# Patient Record
Sex: FEMALE | Race: WHITE | NOT HISPANIC OR LATINO | URBAN - METROPOLITAN AREA
[De-identification: names, ages, dates, MRNs, and addresses within clinical notes are randomized per-mention and may not be internally consistent; named-entity substitution may affect disease eponyms.]

---

## 2019-01-10 ENCOUNTER — EMERGENCY (EMERGENCY)
Facility: HOSPITAL | Age: 24
LOS: 1 days | Discharge: HOME | End: 2019-01-10
Attending: EMERGENCY MEDICINE
Payer: COMMERCIAL

## 2019-01-10 VITALS
RESPIRATION RATE: 18 BRPM | HEART RATE: 76 BPM | TEMPERATURE: 98 F | OXYGEN SATURATION: 97 % | SYSTOLIC BLOOD PRESSURE: 116 MMHG | WEIGHT: 125.66 LBS | DIASTOLIC BLOOD PRESSURE: 74 MMHG | HEIGHT: 65 IN

## 2019-01-10 VITALS
DIASTOLIC BLOOD PRESSURE: 65 MMHG | RESPIRATION RATE: 18 BRPM | SYSTOLIC BLOOD PRESSURE: 115 MMHG | TEMPERATURE: 98 F | HEART RATE: 65 BPM | OXYGEN SATURATION: 95 %

## 2019-01-10 LAB
APPEARANCE UR: ABNORMAL
BACTERIA # UR AUTO: ABNORMAL
BILIRUB UR-MCNC: ABNORMAL
COLOR SPEC: ABNORMAL
DIFF PNL FLD: ABNORMAL
EPI CELLS # UR: 11 /HPF — HIGH
GLUCOSE UR QL: NEGATIVE — SIGNIFICANT CHANGE UP
HYALINE CASTS # UR AUTO: 5 /LPF — HIGH (ref 0–2)
KETONES UR-MCNC: NEGATIVE — SIGNIFICANT CHANGE UP
LEUKOCYTE ESTERASE UR-ACNC: ABNORMAL
NITRITE UR-MCNC: POSITIVE
PH UR: 8.5 — HIGH (ref 5–8)
PROT UR-MCNC: ABNORMAL
RBC CASTS # UR COMP ASSIST: 1 /HPF — SIGNIFICANT CHANGE UP (ref 0–4)
SP GR SPEC: 1.02 — SIGNIFICANT CHANGE UP (ref 1.01–1.02)
UROBILINOGEN FLD QL: ABNORMAL
WBC UR QL: 3 /HPF — SIGNIFICANT CHANGE UP (ref 0–5)

## 2019-01-10 PROCEDURE — 99283 EMERGENCY DEPT VISIT LOW MDM: CPT

## 2019-01-10 PROCEDURE — 81001 URINALYSIS AUTO W/SCOPE: CPT

## 2019-01-10 RX ORDER — CEFPODOXIME PROXETIL 100 MG
1 TABLET ORAL
Qty: 28 | Refills: 0 | OUTPATIENT
Start: 2019-01-10 | End: 2019-01-23

## 2019-01-10 RX ORDER — CEFPODOXIME PROXETIL 100 MG
200 TABLET ORAL ONCE
Qty: 0 | Refills: 0 | Status: COMPLETED | OUTPATIENT
Start: 2019-01-10 | End: 2019-01-10

## 2019-01-10 RX ADMIN — Medication 200 MILLIGRAM(S): at 14:10

## 2019-01-10 NOTE — ED PROVIDER NOTE - CARDIAC, MLM
Normal rate, regular rhythm.  Heart sounds S1, S2.  No murmurs, rubs or gallops. no LE swelling bilaterally

## 2019-01-10 NOTE — ED PROVIDER NOTE - MEDICAL DECISION MAKING DETAILS
(Lopez Montero MD): 24 y/o female with pmhx of frequent UTI presents with dysuria and increased urinary frequency with prior hx of untreated UTI. Will check urine, Upreg, and reassess.

## 2019-01-10 NOTE — ED PROVIDER NOTE - NSFOLLOWUPINSTRUCTIONS_ED_ALL_ED_FT
Urinary Tract Infection,urinary tract infection (UTI) is an infection of any part of the urinary tract, which includes the kidneys, ureters, bladder, and urethra. These organs make, store, and get rid of urine in the body. UTI can be a bladder infection (cystitis) or kidney infection (pyelonephritis).    What are the causes?  This infection may be caused by fungi, viruses, and bacteria. Bacteria are the most common cause of UTIs. This condition can also be caused by repeated incomplete emptying of the bladder during urination.     antibiotic medicines frequently or for long periods of time, and the antibiotics no longer work well against certain types of infections (antibiotic resistance).    Fever.  Frequent urination or passing small amounts of urine frequently.  Needing to urinate urgently.  Pain or a burning sensation with urination.  Urine that smells bad or unusual.  Cloudy urine.  Pain in the lower abdomen or back.  Bed wetting.  Trouble urinating.  Blood in the urine.  Irritability.  Vomiting or refusal to eat.  Loose stools.  Sleeping more often than usual.  Being less active than usual.  Vaginal discharge for girls.    Contact a health care provider if:  back pain.  fever.  nauseous or vomits.  symptoms have not improved after you have given antibiotics for two days.  symptoms go away and then return.  Get help right away if:  severe back pain or lower abdominal pain.    Take Cefpodoxime twice per day for 2 weeks

## 2019-01-10 NOTE — ED PROVIDER NOTE - GASTROINTESTINAL NEGATIVE STATEMENT, MLM
no abdominal pain, no bloating, no constipation, no diarrhea, no nausea, no blood in stool, no flank pain

## 2019-01-10 NOTE — ED PROVIDER NOTE - NS_ ATTENDINGSCRIBEDETAILS _ED_A_ED_FT
I performed a history and physical exam of the patient and discussed their management with the resident. I reviewed the scribe's note and agree with the documented findings and plan of care.  Shae Lopez MD

## 2019-01-10 NOTE — ED PROVIDER NOTE - OBJECTIVE STATEMENT
22 y/o female with pmhx of frequent UTI c/o pain/burning upon urination x2 days .+increased urinary frequency. Pt states sxs are similar to past UTIs. Took pyridium this morning for pain. States she would usually get fever and bleeding with UTIs. Since, she gets UTIs more frequently (4x since 9/2018) but with less severity. Had 2 emetic episodes since yesterday but feels they are related to stress from personal life. Pt has only gotten abx for UTI once in last 4 episodes. States that with her first UTI, PMD believed she passed a kidney stone due to associated sxs but is unsure. Pt has also had an ovarian cyst in the past. Denies fever, flank pain, vaginal discharge, vaginal bleeding, diarrhea, or blood in stool.    Unsure of LNMP. Has an IUD. Sexually active with 1 partner, male. No other protection.

## 2019-01-10 NOTE — ED ADULT NURSE NOTE - NSIMPLEMENTINTERV_GEN_ALL_ED
Implemented All Universal Safety Interventions:  [place] to call system. Call bell, personal items and telephone within reach. Instruct patient to call for assistance. Room bathroom lighting operational. Non-slip footwear when patient is off stretcher. Physically safe environment: no spills, clutter or unnecessary equipment. Stretcher in lowest position, wheels locked, appropriate side rails in place.

## 2019-01-10 NOTE — ED ADULT NURSE NOTE - OBJECTIVE STATEMENT
24 y/o F, reported to ED from home. A&ox3, c/o pelvic pain and dysuria. Pt reports that the pain started a few days ago and has gotten worse. Pt reports that she has a hx UTI and that her symptoms are similar to those of a UTI. Pt reports that her pain is 4/10 currently but increases when she urinates. Pt reports that she took Pyramidum today prior to arrival. Pt reports that she has vaginal burning and redness. Pt reports that she is sexually active and that she uses an IUD. Pt denies using condoms in a year. Pt reports that she is in a monogamous relationship x1 year. Pt reports that her last sexual encounter was 4 days ago. Pt reports that her symptoms started the day after. Pt denies fever or chills. Pt denies hematuria. Pt reports that since April 2018 she hasn't had a normal period. Pt reports that the last time she had spotting was in August. Pt denies LOC, SOB, C/P, N/V/D. Pt reports a hx of UTI and ovarian cyst. Pt's boyfriend is in the waiting room, will continue to monitor pt.

## 2019-03-30 NOTE — ED ADULT TRIAGE NOTE - BP NONINVASIVE DIASTOLIC (MM HG)
[facility] transportation request/order entered for wheelchair van transport with oxygen 2L NC for  now/as soon as possible. Spoke with patient's nurse Ramy and patient re: transportation home at discharge from the hospital today.   74

## 2023-06-23 NOTE — ED PROVIDER NOTE - ATTENDING CONTRIBUTION TO CARE
[facility] PROGRESS NOTE  {(TIPs) Info that disappears when note is signed:3}      Subjective     Karina is a 22 year old here for No chief complaint on file.     Follows with GYN    Social Determinants Never Smoker    Objective {Show More Vitals:3}  There were no vitals filed for this visit.  Physical Exam  {Select Exam or use NoteWriter Above :[id number]}  {Care Gap -  If Performed - Pelvic Exam - Review Pap Trackin}       ASSESSMENT AND PLAN  {(TIP) Problem List (23)  Medications (15)  History:3}     {There are no diagnoses linked to this encounter. (Refresh or delete this SmartLink)}    Follow Up           
I performed a history and physical exam of the patient and discussed their management with the resident. I reviewed the resident's note and agree with the documented findings and plan of care.  Shae Lopez MD